# Patient Record
Sex: MALE | HISPANIC OR LATINO | URBAN - METROPOLITAN AREA
[De-identification: names, ages, dates, MRNs, and addresses within clinical notes are randomized per-mention and may not be internally consistent; named-entity substitution may affect disease eponyms.]

---

## 2019-11-18 ENCOUNTER — OFFICE VISIT (OUTPATIENT)
Dept: URBAN - METROPOLITAN AREA CLINIC 88 | Facility: CLINIC | Age: 64
End: 2019-11-18
Payer: MEDICAID

## 2019-11-18 DIAGNOSIS — H49.02 THIRD [OCULOMOTOR] NERVE PALSY, LEFT EYE: Primary | ICD-10-CM

## 2019-11-18 DIAGNOSIS — H25.13 AGE-RELATED NUCLEAR CATARACT, BILATERAL: ICD-10-CM

## 2019-11-18 DIAGNOSIS — H53.2 DIPLOPIA: ICD-10-CM

## 2019-11-18 DIAGNOSIS — G30.9 ALZHEIMER'S DISEASE, UNSPECIFIED: ICD-10-CM

## 2019-11-18 PROCEDURE — 99203 OFFICE O/P NEW LOW 30 MIN: CPT | Performed by: OPHTHALMOLOGY

## 2019-11-18 ASSESSMENT — KERATOMETRY
OS: 44.13
OD: 44.25

## 2019-11-18 ASSESSMENT — INTRAOCULAR PRESSURE
OD: 18
OS: 16

## 2019-11-18 NOTE — IMPRESSION/PLAN
Impression: Diplopia: H53.2. Plan: The patient may consider wearing a patch intermittently while reading in order prevent the Diplopia.

## 2019-11-18 NOTE — IMPRESSION/PLAN
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 Condition: unstable. Symptoms: will continue to monitor. Plan: Discussed diagnosis in detail with patient. Patient was seen in ER at [facility]. [facility] to request Lab work. They denied my request. I would recommend lab work: HgbA1c and FBS. We will call patient's son once we obtain patient's lab results.  Recheck 1 month